# Patient Record
Sex: MALE | Race: BLACK OR AFRICAN AMERICAN | NOT HISPANIC OR LATINO | Employment: OTHER | URBAN - METROPOLITAN AREA
[De-identification: names, ages, dates, MRNs, and addresses within clinical notes are randomized per-mention and may not be internally consistent; named-entity substitution may affect disease eponyms.]

---

## 2018-07-30 DIAGNOSIS — D50.9 IRON DEFICIENCY ANEMIA, UNSPECIFIED IRON DEFICIENCY ANEMIA TYPE: Primary | ICD-10-CM

## 2018-08-07 ENCOUNTER — TELEPHONE (OUTPATIENT)
Dept: FAMILY MEDICINE | Facility: CLINIC | Age: 78
End: 2018-08-07

## 2018-08-07 NOTE — TELEPHONE ENCOUNTER
----- Message from Ilene Thomson sent at 8/7/2018  9:28 AM CDT -----  Contact: Self  Pt is calling to have blood work orders put in the system before his appt on 08/13. Please call pt to advise when orders are in . Please call [phone].

## 2018-08-07 NOTE — TELEPHONE ENCOUNTER
Returned call, advised patient that since he is a new patient he will have to wait until his appointment to get his lab orders.  Patient verbalized understanding.

## 2018-08-13 ENCOUNTER — OFFICE VISIT (OUTPATIENT)
Dept: FAMILY MEDICINE | Facility: CLINIC | Age: 78
End: 2018-08-13
Payer: MEDICARE

## 2018-08-13 VITALS
SYSTOLIC BLOOD PRESSURE: 100 MMHG | DIASTOLIC BLOOD PRESSURE: 60 MMHG | OXYGEN SATURATION: 97 % | HEART RATE: 76 BPM | BODY MASS INDEX: 23.32 KG/M2 | WEIGHT: 153.88 LBS | HEIGHT: 68 IN

## 2018-08-13 DIAGNOSIS — Z00.00 ANNUAL PHYSICAL EXAM: Primary | ICD-10-CM

## 2018-08-13 DIAGNOSIS — R41.3 MEMORY CHANGES: ICD-10-CM

## 2018-08-13 DIAGNOSIS — R79.9 ABNORMAL FINDING OF BLOOD CHEMISTRY: ICD-10-CM

## 2018-08-13 DIAGNOSIS — Z11.59 ENCOUNTER FOR HEPATITIS C SCREENING TEST FOR LOW RISK PATIENT: ICD-10-CM

## 2018-08-13 DIAGNOSIS — Z12.11 COLON CANCER SCREENING: ICD-10-CM

## 2018-08-13 DIAGNOSIS — Z12.5 SCREENING FOR PROSTATE CANCER: ICD-10-CM

## 2018-08-13 PROCEDURE — 99999 PR PBB SHADOW E&M-EST. PATIENT-LVL III: CPT | Mod: PBBFAC,,, | Performed by: FAMILY MEDICINE

## 2018-08-13 PROCEDURE — 99213 OFFICE O/P EST LOW 20 MIN: CPT | Mod: S$GLB,,, | Performed by: FAMILY MEDICINE

## 2018-08-13 RX ORDER — ASPIRIN 81 MG/1
81 TABLET ORAL DAILY
COMMUNITY

## 2018-08-13 NOTE — PROGRESS NOTES
Subjective:       Patient ID: Angelique Pierre is a 78 y.o. male.    Chief Complaint: Establish Care and Tingling in fingers    patient is a new patient to me for an annual checkup. He states he feels fine, but has problems remembering things. He has someone to help him with remembering things that needs to get done. He has a son and a daughter. He has a lady friend that helps him with remembering things. He is  and he and his wife live together, but don't have a relationship that they can discuss things. .His emergency contact is his wife, Aubrie. He feels comfortable discussing things  with his son, Angelique Pierre Jr and would like to discuss advanced directives with him    He went to the 11th grade in school. He states he has noticed that his memory is worsening over the last few years. He states he sometimes can't recall the names of things like a watch or people's names.     History reviewed. No pertinent past medical history.   History reviewed. No pertinent surgical history.  Review of patient's family history indicates:  Problem: No Known Problems      Relation: Father          Age of Onset: (Not Specified)  Problem: Cancer      Relation: Mother          Age of Onset: (Not Specified)  Problem: No Known Problems      Relation: Sister          Age of Onset: (Not Specified)  Problem: No Known Problems      Relation: Brother          Age of Onset: (Not Specified)  Problem: No Known Problems      Relation: Sister          Age of Onset: (Not Specified)    Social History    Socioeconomic History      Marital status:       Spouse name: Not on file      Number of children: Not on file      Years of education: Not on file      Highest education level: Not on file    Social Needs      Financial resource strain: Not on file      Food insecurity - worry: Not on file      Food insecurity - inability: Not on file      Transportation needs - medical: Not on file      Transportation needs - non-medical:  "Not on file    Occupational History      Not on file    Tobacco Use      Smoking status: Never Smoker      Smokeless tobacco: Never Used    Substance and Sexual Activity      Alcohol use: Yes        Alcohol/week: 0.0 oz      Drug use: No      Sexual activity: Yes    Other Topics      Concerns:        Not on file    Social History Narrative      Not on file          Review of Systems   Constitutional: Negative for fatigue.   HENT: Negative for hearing loss, rhinorrhea, sneezing and sore throat.    Eyes: Negative for visual disturbance.   Respiratory: Negative for cough, chest tightness, shortness of breath and wheezing.    Cardiovascular: Negative for chest pain, palpitations and leg swelling.   Gastrointestinal: Negative for abdominal pain, constipation, diarrhea, nausea and vomiting.   Endocrine: Negative for polydipsia, polyphagia and polyuria.   Genitourinary: Negative for dysuria, frequency, hematuria and urgency.   Musculoskeletal: Negative for arthralgias and back pain.   Skin: Negative for rash.   Neurological: Negative for dizziness, syncope, light-headedness and headaches.       Objective:       Vitals:    08/13/18 0955   BP: 100/60   Pulse: 76   SpO2: 97%   Weight: 69.8 kg (153 lb 14.1 oz)   Height: 5' 8" (1.727 m)       Physical Exam   Constitutional: He is oriented to person, place, and time. He appears well-developed and well-nourished. No distress.   HENT:   Head: Normocephalic.   Right Ear: External ear normal.   Left Ear: External ear normal.   Mouth/Throat: Oropharynx is clear and moist. No oropharyngeal exudate.   Eyes: Conjunctivae are normal.   Neck: Normal range of motion. Neck supple. No thyromegaly present.   Cardiovascular: Normal rate, regular rhythm and normal heart sounds. Exam reveals no gallop and no friction rub.   No murmur heard.  Pulmonary/Chest: Effort normal and breath sounds normal. No respiratory distress. He has no wheezes. He has no rales.   Abdominal: Soft. Bowel sounds are " normal. He exhibits no distension and no mass. There is no tenderness. There is no rebound and no guarding.   Musculoskeletal: He exhibits no edema.   Lymphadenopathy:     He has no cervical adenopathy.   Neurological: He is alert and oriented to person, place, and time.   Skin: No rash noted. He is not diaphoretic.   Psychiatric: He has a normal mood and affect.       Assessment:       1. Annual physical exam    2. Screening for prostate cancer    3. Colon cancer screening    4. Abnormal finding of blood chemistry     5. Encounter for hepatitis C screening test for low risk patient    6. Memory changes        Plan:         Angelique was seen today for establish care and tingling in fingers.    Diagnoses and all orders for this visit:    Annual physical exam  -     Lipid panel; Future  -     CBC auto differential; Future  -     Comprehensive metabolic panel; Future  Due for labs    Screening for prostate cancer  -     PSA, Screening; Future    Colon cancer screening  -     Case request GI: COLONOSCOPY  Due for colonoscopy  Abnormal finding of blood chemistry   -     Lipid panel; Future    Encounter for hepatitis C screening test for low risk patient  -     Hepatitis C antibody; Future    Memory changes  -     Vitamin B12; Future  -     Folate; Future  -     TSH; Future  -     T4, free; Future  -     CT Head Without Contrast; Future  Will check for causes  Other orders  -     Cancel: CBC auto differential; Future  -     Cancel: Comprehensive metabolic panel; Future  -     Cancel: Lipid panel; Future

## 2018-08-20 ENCOUNTER — HOSPITAL ENCOUNTER (OUTPATIENT)
Dept: RADIOLOGY | Facility: HOSPITAL | Age: 78
Discharge: HOME | End: 2018-08-20
Attending: FAMILY MEDICINE
Payer: MEDICARE

## 2018-08-20 DIAGNOSIS — R41.3 MEMORY CHANGES: ICD-10-CM

## 2018-08-20 PROCEDURE — 70450 CT HEAD/BRAIN W/O DYE: CPT | Mod: TC

## 2018-08-20 PROCEDURE — 70450 CT HEAD/BRAIN W/O DYE: CPT | Mod: 26,,, | Performed by: RADIOLOGY

## 2018-08-22 ENCOUNTER — TELEPHONE (OUTPATIENT)
Dept: FAMILY MEDICINE | Facility: CLINIC | Age: 78
End: 2018-08-22

## 2018-08-22 DIAGNOSIS — R79.89 ELEVATED PLATELET COUNT: ICD-10-CM

## 2018-08-22 DIAGNOSIS — J32.9 SINUSITIS, BACTERIAL: Primary | ICD-10-CM

## 2018-08-22 DIAGNOSIS — D64.9 NORMOCYTIC ANEMIA: ICD-10-CM

## 2018-08-22 DIAGNOSIS — B96.89 SINUSITIS, BACTERIAL: Primary | ICD-10-CM

## 2018-08-22 RX ORDER — AMOXICILLIN AND CLAVULANATE POTASSIUM 500; 125 MG/1; MG/1
1 TABLET, FILM COATED ORAL 2 TIMES DAILY
Qty: 14 TABLET | Refills: 0 | Status: SHIPPED | OUTPATIENT
Start: 2018-08-22 | End: 2018-08-29

## 2018-08-22 NOTE — TELEPHONE ENCOUNTER
Let pt know we need to do additional blood work for his blood count which was low and send him to a blood doctor if those labs are abnormal.  His platelet is high so we need to increase his Asprin to 325 mg.    Also his CT scan showed sinus disease so we will treat that with antibiotics.

## 2018-08-24 ENCOUNTER — TELEPHONE (OUTPATIENT)
Dept: FAMILY MEDICINE | Facility: CLINIC | Age: 78
End: 2018-08-24

## 2018-08-24 NOTE — TELEPHONE ENCOUNTER
----- Message from Amanda Fung sent at 8/23/2018  3:28 PM CDT -----  Contact: Dallas [phone]  Patient is returning a call back to the staff . Please call at your earliest convenience.

## 2018-08-27 ENCOUNTER — TELEPHONE (OUTPATIENT)
Dept: FAMILY MEDICINE | Facility: CLINIC | Age: 78
End: 2018-08-27

## 2018-08-27 NOTE — TELEPHONE ENCOUNTER
----- Message from Adriel Whelan sent at 8/27/2018  9:21 AM CDT -----  Contact: self  Pt is returning Anika's call from Friday regarding his test results. Please contact him at [phone].    Thanks

## 2018-08-27 NOTE — TELEPHONE ENCOUNTER
Patient would like a call from the provider.  Stated he has never been sick before and is now being told he has all of these problems.  Would like to speak to the doctor so she can explain how and why all of this is happening at this time.  Please advise.

## 2018-08-28 NOTE — TELEPHONE ENCOUNTER
Left message for patient to call clinic back to schedule follow up/results and additional labs that are ordered (non fasting)

## 2018-08-29 ENCOUNTER — OFFICE VISIT (OUTPATIENT)
Dept: FAMILY MEDICINE | Facility: CLINIC | Age: 78
End: 2018-08-29
Payer: MEDICARE

## 2018-08-29 ENCOUNTER — LAB VISIT (OUTPATIENT)
Dept: LAB | Facility: HOSPITAL | Age: 78
End: 2018-08-29
Attending: FAMILY MEDICINE
Payer: MEDICARE

## 2018-08-29 VITALS
HEIGHT: 67 IN | BODY MASS INDEX: 23.46 KG/M2 | SYSTOLIC BLOOD PRESSURE: 110 MMHG | OXYGEN SATURATION: 97 % | TEMPERATURE: 98 F | DIASTOLIC BLOOD PRESSURE: 78 MMHG | WEIGHT: 149.5 LBS | HEART RATE: 68 BPM

## 2018-08-29 DIAGNOSIS — D50.9 MICROCYTIC ANEMIA: ICD-10-CM

## 2018-08-29 DIAGNOSIS — R79.89 ELEVATED PLATELET COUNT: ICD-10-CM

## 2018-08-29 DIAGNOSIS — G62.9 NEUROPATHY: Primary | ICD-10-CM

## 2018-08-29 DIAGNOSIS — D64.9 NORMOCYTIC ANEMIA: ICD-10-CM

## 2018-08-29 LAB
BASOPHILS # BLD AUTO: 0.16 K/UL
BASOPHILS NFR BLD: 2.7 %
DIFFERENTIAL METHOD: ABNORMAL
EOSINOPHIL # BLD AUTO: 0.1 K/UL
EOSINOPHIL NFR BLD: 2 %
ERYTHROCYTE [DISTWIDTH] IN BLOOD BY AUTOMATED COUNT: 26.1 %
FERRITIN SERPL-MCNC: 427 NG/ML
HCT VFR BLD AUTO: 33.5 %
HGB BLD-MCNC: 11.1 G/DL
IRON SERPL-MCNC: 127 UG/DL
LYMPHOCYTES # BLD AUTO: 1.5 K/UL
LYMPHOCYTES NFR BLD: 24.3 %
MCH RBC QN AUTO: 27.5 PG
MCHC RBC AUTO-ENTMCNC: 33.1 G/DL
MCV RBC AUTO: 83 FL
MONOCYTES # BLD AUTO: 0.7 K/UL
MONOCYTES NFR BLD: 12.3 %
NEUTROPHILS # BLD AUTO: 3.5 K/UL
NEUTROPHILS NFR BLD: 58.7 %
PATH REV BLD -IMP: NORMAL
PLATELET # BLD AUTO: 963 K/UL
PMV BLD AUTO: 9.1 FL
RBC # BLD AUTO: 4.04 M/UL
SATURATED IRON: 44 %
TOTAL IRON BINDING CAPACITY: 286 UG/DL
TRANSFERRIN SERPL-MCNC: 193 MG/DL
WBC # BLD AUTO: 6.02 K/UL

## 2018-08-29 PROCEDURE — 99213 OFFICE O/P EST LOW 20 MIN: CPT | Mod: S$GLB,,, | Performed by: FAMILY MEDICINE

## 2018-08-29 PROCEDURE — 36415 COLL VENOUS BLD VENIPUNCTURE: CPT | Mod: PO

## 2018-08-29 PROCEDURE — 83540 ASSAY OF IRON: CPT

## 2018-08-29 PROCEDURE — 99999 PR PBB SHADOW E&M-EST. PATIENT-LVL III: CPT | Mod: PBBFAC,,, | Performed by: FAMILY MEDICINE

## 2018-08-29 PROCEDURE — 85060 BLOOD SMEAR INTERPRETATION: CPT | Mod: ,,, | Performed by: PATHOLOGY

## 2018-08-29 PROCEDURE — 85025 COMPLETE CBC W/AUTO DIFF WBC: CPT

## 2018-08-29 PROCEDURE — 82728 ASSAY OF FERRITIN: CPT

## 2018-08-29 PROCEDURE — 83021 HEMOGLOBIN CHROMOTOGRAPHY: CPT

## 2018-08-29 RX ORDER — GABAPENTIN 100 MG/1
100 CAPSULE ORAL NIGHTLY
Qty: 30 CAPSULE | Refills: 11 | Status: SHIPPED | OUTPATIENT
Start: 2018-08-29 | End: 2018-09-26

## 2018-08-29 NOTE — PROGRESS NOTES
Subjective:       Patient ID: Dallas Pierre is a 78 y.o. male.    Chief Complaint: Tingling (in fingers.. Still going on); Follow-up; and Results (labs)    78 year old male presents for follow-up on his test results. He has been experiencing tingling his fingers. He had labs, which showed a normocytic anemia with normal B 12, folate. He has a normal CMP and lipid as well. He states he has been told he is anemic, but is unaware if he has some sort of hemoglobinopathy.       No past medical history on file.   No past surgical history on file.  Review of patient's family history indicates:  Problem: No Known Problems      Relation: Father          Age of Onset: (Not Specified)  Problem: Cancer      Relation: Mother          Age of Onset: (Not Specified)  Problem: No Known Problems      Relation: Sister          Age of Onset: (Not Specified)  Problem: No Known Problems      Relation: Brother          Age of Onset: (Not Specified)  Problem: No Known Problems      Relation: Sister          Age of Onset: (Not Specified)    Social History    Socioeconomic History      Marital status:       Spouse name: Not on file      Number of children: Not on file      Years of education: Not on file      Highest education level: Not on file    Social Needs      Financial resource strain: Not on file      Food insecurity - worry: Not on file      Food insecurity - inability: Not on file      Transportation needs - medical: Not on file      Transportation needs - non-medical: Not on file    Occupational History      Not on file    Tobacco Use      Smoking status: Never Smoker      Smokeless tobacco: Never Used    Substance and Sexual Activity      Alcohol use: Yes        Alcohol/week: 0.0 oz      Drug use: No      Sexual activity: Yes    Other Topics      Concerns:        Not on file    Social History Narrative      Not on file          Review of Systems   Constitutional: Negative for fatigue.   Respiratory: Negative for cough,  "chest tightness, shortness of breath and wheezing.    Cardiovascular: Negative for chest pain, palpitations and leg swelling.   Gastrointestinal: Negative for abdominal pain, nausea and vomiting.   Endocrine: Negative for polydipsia, polyphagia and polyuria.   Neurological: Negative for dizziness, syncope, light-headedness and headaches.       Objective:       Vitals:    08/29/18 0926   BP: 110/78   Pulse: 68   Temp: 98.4 °F (36.9 °C)   TempSrc: Oral   SpO2: 97%   Weight: 67.8 kg (149 lb 7.6 oz)   Height: 5' 7" (1.702 m)       Physical Exam   Constitutional: He is oriented to person, place, and time. He appears well-developed and well-nourished. No distress.   HENT:   Head: Normocephalic and atraumatic.   Eyes: Conjunctivae are normal.   Neck: Normal range of motion. Neck supple.   Cardiovascular: Normal rate, regular rhythm and normal heart sounds. Exam reveals no gallop and no friction rub.   No murmur heard.  Pulmonary/Chest: Effort normal and breath sounds normal. No respiratory distress. He has no wheezes. He has no rales.   Neurological: He is alert and oriented to person, place, and time.   Skin: He is not diaphoretic.       Assessment:       1. Neuropathy    2. Microcytic anemia        Plan:       Dallas was seen today for tingling, follow-up and results.    Diagnoses and all orders for this visit:    Neuropathy  -     gabapentin (NEURONTIN) 100 MG capsule; Take 1 capsule (100 mg total) by mouth every evening.  trial of gabapentin to help with neuropathy.        Microcytic anemia  He is going to get labs to check for causes of anemia      "

## 2018-08-30 LAB — PATH REV BLD -IMP: NORMAL

## 2018-08-31 LAB
HGB A2 MFR BLD HPLC: 2.1 %
HGB FRACT BLD ELPH-IMP: ABNORMAL
HGB FRACT BLD ELPH-IMP: NORMAL

## 2018-09-11 NOTE — PROGRESS NOTES
Chief Complaint :   Increased Platelet Count    Hx of Present illness :  Patient is a 78 y.o. year old male who presents to the clinic today for   Oncology evaluation      Allergies :    Review of patient's allergies indicates:  No Known Allergies    Occupation :  Landscaping    Transfusion :  None    Menstrual & obstetric Hx :  N/A      Present Meds :      Medication List           Accurate as of 9/11/18  7:37 AM. If you have any questions, ask your nurse or doctor.               CONTINUE taking these medications    aspirin 81 MG EC tablet  Commonly known as:  ECOTRIN     gabapentin 100 MG capsule  Commonly known as:  NEURONTIN  Take 1 capsule (100 mg total) by mouth every evening.            Past Medical Hx : No Hx of Hypertension, DM, PUD, Hepatitis, Liver disease, thyroid dysfunction, TB, sarcoid, Lupus, rheumatoid arthritis, seizure disorder, CVA.  No DVT or PE. No Hx of Cancer, Chemotherapy or radiation therapy.    Past Medical Hx :  No past medical history on file.    Travel Hx :  N/A    Immunization :    There is no immunization history on file for this patient.    Family Hx :  Family History   Problem Relation Age of Onset    No Known Problems Father     Cancer Mother     No Known Problems Sister     No Known Problems Brother     No Known Problems Sister        Social Hx :  Social History     Socioeconomic History    Marital status:      Spouse name: Not on file    Number of children: Not on file    Years of education: Not on file    Highest education level: Not on file   Social Needs    Financial resource strain: Not on file    Food insecurity - worry: Not on file    Food insecurity - inability: Not on file    Transportation needs - medical: Not on file    Transportation needs - non-medical: Not on file   Occupational History    Not on file   Tobacco Use    Smoking status: Never Smoker    Smokeless tobacco: Never Used   Substance and Sexual Activity    Alcohol use: Yes      Alcohol/week: 0.0 oz    Drug use: No    Sexual activity: Yes   Other Topics Concern    Not on file   Social History Narrative    Not on file       Surgery :  Colonoscopy.    Symptoms :    Review of Systems   Constitutional: Negative for chills, diaphoresis, fever, malaise/fatigue and weight loss.        Good appetite; no fever, pruritis, night sweats.   HENT: Positive for hearing loss and nosebleeds (Had Two nosebleeds last week.). Negative for congestion, ear discharge, ear pain, sinus pain, sore throat and tinnitus.    Eyes: Negative for blurred vision, double vision, photophobia, pain, discharge and redness.        Vision not Clear.   Respiratory: Negative for cough, hemoptysis, sputum production, shortness of breath, wheezing and stridor.    Cardiovascular: Negative for chest pain, palpitations, orthopnea, claudication, leg swelling and PND.   Gastrointestinal: Negative for abdominal pain, blood in stool, constipation, diarrhea, heartburn, melena, nausea and vomiting.   Genitourinary: Negative for dysuria, flank pain, frequency, hematuria and urgency.        Slow stream. Hx of BPH   Musculoskeletal: Negative for back pain, falls, joint pain, myalgias and neck pain.   Skin: Negative for itching and rash.   Neurological: Negative for dizziness, tingling, tremors, sensory change, speech change, focal weakness, seizures, loss of consciousness, weakness and headaches.   Endo/Heme/Allergies: Negative for environmental allergies and polydipsia. Does not bruise/bleed easily.   Psychiatric/Behavioral: Negative for depression, hallucinations, memory loss, substance abuse and suicidal ideas. The patient is nervous/anxious and has insomnia.        Physical Exam :   Physical Exam   Constitutional: He is oriented to person, place, and time and well-developed, well-nourished, and in no distress. Vital signs are normal. No distress.   HENT:   Head: Normocephalic and atraumatic.   Right Ear: External ear normal.   Left Ear:  External ear normal.   Nose: Nose normal.   Mouth/Throat: Oropharynx is clear and moist. No oropharyngeal exudate.   Eyes: Conjunctivae, EOM and lids are normal. Pupils are equal, round, and reactive to light. Lids are everted and swept, no foreign bodies found. Right eye exhibits no discharge. Left eye exhibits no discharge. No scleral icterus.   Neck: Trachea normal, normal range of motion and full passive range of motion without pain. Neck supple. Normal carotid pulses, no hepatojugular reflux and no JVD present. No tracheal tenderness present. Carotid bruit is not present. No tracheal deviation present. No thyroid mass and no thyromegaly present.   Cardiovascular: Normal rate, regular rhythm, normal heart sounds, intact distal pulses and normal pulses. Exam reveals no gallop and no friction rub.   No murmur heard.  Pulmonary/Chest: Effort normal and breath sounds normal. No stridor. No apnea. No respiratory distress. He has no wheezes. He has no rales. He exhibits no tenderness.   Abdominal: Soft. Normal appearance, normal aorta and bowel sounds are normal. He exhibits no distension and no mass. There is no hepatosplenomegaly. There is no tenderness. There is no rebound, no guarding and no CVA tenderness. No hernia.   Genitourinary:   Genitourinary Comments: Not examined   Musculoskeletal: He exhibits no edema, tenderness or deformity.   Lymphadenopathy:        Head (right side): No submental, no submandibular, no tonsillar, no preauricular, no posterior auricular and no occipital adenopathy present.        Head (left side): No submental, no submandibular, no tonsillar, no preauricular, no posterior auricular and no occipital adenopathy present.     He has no cervical adenopathy.     He has no axillary adenopathy.        Right: No inguinal, no supraclavicular and no epitrochlear adenopathy present.        Left: No inguinal, no supraclavicular and no epitrochlear adenopathy present.   Neurological: He is alert  and oriented to person, place, and time. He has normal motor skills, normal strength and intact cranial nerves. No cranial nerve deficit. Gait normal. Coordination normal. GCS score is 15.   Skin: Skin is warm, dry and intact. No rash noted. He is not diaphoretic. No cyanosis or erythema. No pallor. Nails show no clubbing.   Psychiatric: Mood, memory, affect and judgment normal.         Labs & Imaging : 08/29/18 : Hgb 11.1; Hct 33.5; MCV 83; Plts 963,000  ANC 3,100.  Plt Count was 934,000 on 08/20/18;  CMP normal on 08/20/18.  08/29/18 : Serum B12 and Folate were normal.  Hemoglobin electrophoresis normal. HCV antibody Neg. PSA 1.2        Dx :   Thrombocytosis.      Assessment & Plan: reviewed with patient. Repeat CBC,Plts, LDH, Uric acid, serum Iron,  CHARLES 2 mutation, Chest X Ray, U/S abdomen, re evaluate with results. Patient understands and verbalised.

## 2018-09-12 ENCOUNTER — INITIAL CONSULT (OUTPATIENT)
Dept: HEMATOLOGY/ONCOLOGY | Facility: CLINIC | Age: 78
End: 2018-09-12
Payer: MEDICARE

## 2018-09-12 VITALS
SYSTOLIC BLOOD PRESSURE: 118 MMHG | OXYGEN SATURATION: 97 % | WEIGHT: 146.5 LBS | HEIGHT: 67 IN | BODY MASS INDEX: 22.99 KG/M2 | TEMPERATURE: 98 F | HEART RATE: 68 BPM | DIASTOLIC BLOOD PRESSURE: 75 MMHG

## 2018-09-12 DIAGNOSIS — D75.839 THROMBOCYTOSIS: Primary | ICD-10-CM

## 2018-09-12 PROCEDURE — 1101F PT FALLS ASSESS-DOCD LE1/YR: CPT | Mod: CPTII,,, | Performed by: INTERNAL MEDICINE

## 2018-09-12 PROCEDURE — 99205 OFFICE O/P NEW HI 60 MIN: CPT | Mod: S$PBB,,, | Performed by: INTERNAL MEDICINE

## 2018-09-12 PROCEDURE — 99999 PR PBB SHADOW E&M-EST. PATIENT-LVL III: CPT | Mod: PBBFAC,,, | Performed by: INTERNAL MEDICINE

## 2018-09-12 PROCEDURE — 99213 OFFICE O/P EST LOW 20 MIN: CPT | Mod: PBBFAC | Performed by: INTERNAL MEDICINE

## 2018-09-12 NOTE — LETTER
September 12, 2018      KRISTEN Fermin  [address] [address]  [place] [address]           [facility]  [address] [facility] [address] [place] [address]  Phone: [phone]          Patient: Dallas Pierre   MR Number: [id number]   YOB: 1940   Date of Visit: 9/12/2018       Dear Dany Fischer:    Thank you for referring Dallas Pierre to me for evaluation. Attached you will find relevant portions of my assessment and plan of care.    If you have questions, please do not hesitate to call me. I look forward to following Dallas Pierre along with you.    Sincerely,    Alpa Calhoun MD    Enclosure  CC:  No Recipients    If you would like to receive this communication electronically, please contact [email or web address] or [phone] to request more information on [employer] access.    For providers and/or their staff who would like to refer a patient to [facility], please contact us through our one-stop-shop provider referral line, Norbert Clement, at [phone].    If you feel you have received this communication in error or would no longer like to receive these types of communications, please e-mail [email or web address]

## 2018-09-13 ENCOUNTER — LAB VISIT (OUTPATIENT)
Dept: LAB | Facility: HOSPITAL | Age: 78
End: 2018-09-13
Attending: INTERNAL MEDICINE
Payer: MEDICARE

## 2018-09-13 DIAGNOSIS — D75.839 THROMBOCYTOSIS: ICD-10-CM

## 2018-09-13 LAB
BASOPHILS # BLD AUTO: 0.19 K/UL
BASOPHILS NFR BLD: 2.9 %
DIFFERENTIAL METHOD: ABNORMAL
EOSINOPHIL # BLD AUTO: 0.1 K/UL
EOSINOPHIL NFR BLD: 2.1 %
ERYTHROCYTE [DISTWIDTH] IN BLOOD BY AUTOMATED COUNT: 26.1 %
HCT VFR BLD AUTO: 32.3 %
HGB BLD-MCNC: 10.8 G/DL
IRON SERPL-MCNC: 178 UG/DL
LDH SERPL L TO P-CCNC: 181 U/L
LYMPHOCYTES # BLD AUTO: 1.6 K/UL
LYMPHOCYTES NFR BLD: 23.7 %
MCH RBC QN AUTO: 27.4 PG
MCHC RBC AUTO-ENTMCNC: 33.4 G/DL
MCV RBC AUTO: 82 FL
MONOCYTES # BLD AUTO: 0.6 K/UL
MONOCYTES NFR BLD: 9.8 %
NEUTROPHILS # BLD AUTO: 4 K/UL
NEUTROPHILS NFR BLD: 61.5 %
PLATELET # BLD AUTO: 948 K/UL
PMV BLD AUTO: 9 FL
RBC # BLD AUTO: 3.94 M/UL
SATURATED IRON: 61 %
TOTAL IRON BINDING CAPACITY: 293 UG/DL
TRANSFERRIN SERPL-MCNC: 198 MG/DL
URATE SERPL-MCNC: 5.4 MG/DL
WBC # BLD AUTO: 6.54 K/UL

## 2018-09-13 PROCEDURE — 85025 COMPLETE CBC W/AUTO DIFF WBC: CPT

## 2018-09-13 PROCEDURE — 36415 COLL VENOUS BLD VENIPUNCTURE: CPT

## 2018-09-13 PROCEDURE — 83540 ASSAY OF IRON: CPT

## 2018-09-13 PROCEDURE — 81270 JAK2 GENE: CPT

## 2018-09-13 PROCEDURE — 84550 ASSAY OF BLOOD/URIC ACID: CPT

## 2018-09-13 PROCEDURE — 83615 LACTATE (LD) (LDH) ENZYME: CPT

## 2018-09-13 PROCEDURE — 82608 B-12 BINDING CAPACITY: CPT

## 2018-09-17 LAB
MPNR  FINAL DIAGNOSIS: NORMAL
MPNR  SPECIMEN TYPE: NORMAL
MPNR RESULT: NORMAL
VIT B12 USB SERPL-MCNC: 1535 PG/ML (ref 800–2600)

## 2018-09-26 ENCOUNTER — OFFICE VISIT (OUTPATIENT)
Dept: FAMILY MEDICINE | Facility: CLINIC | Age: 78
End: 2018-09-26
Payer: MEDICARE

## 2018-09-26 VITALS
HEART RATE: 70 BPM | TEMPERATURE: 98 F | DIASTOLIC BLOOD PRESSURE: 70 MMHG | WEIGHT: 149.06 LBS | OXYGEN SATURATION: 99 % | SYSTOLIC BLOOD PRESSURE: 110 MMHG | BODY MASS INDEX: 23.69 KG/M2

## 2018-09-26 DIAGNOSIS — D75.839 THROMBOCYTOSIS: ICD-10-CM

## 2018-09-26 DIAGNOSIS — G62.9 NEUROPATHY: Primary | ICD-10-CM

## 2018-09-26 PROCEDURE — 99999 PR PBB SHADOW E&M-EST. PATIENT-LVL II: CPT | Mod: PBBFAC,,, | Performed by: FAMILY MEDICINE

## 2018-09-26 PROCEDURE — 1101F PT FALLS ASSESS-DOCD LE1/YR: CPT | Mod: CPTII,,, | Performed by: FAMILY MEDICINE

## 2018-09-26 PROCEDURE — 99212 OFFICE O/P EST SF 10 MIN: CPT | Mod: PBBFAC,PO | Performed by: FAMILY MEDICINE

## 2018-09-26 PROCEDURE — 99213 OFFICE O/P EST LOW 20 MIN: CPT | Mod: S$PBB,,, | Performed by: FAMILY MEDICINE

## 2018-09-26 RX ORDER — GABAPENTIN 300 MG/1
300 CAPSULE ORAL NIGHTLY
Qty: 30 CAPSULE | Refills: 5 | Status: SHIPPED | OUTPATIENT
Start: 2018-09-26 | End: 2019-09-26

## 2018-09-26 NOTE — PROGRESS NOTES
Subjective:       Patient ID: Dallas Pierre is a 78 y.o. male.    Chief Complaint: 2 week follow up    Patient presents for follow-up. He has neuropathy in his hands. He has been started on gabapentin 100mg and only showed mild improvement. He states it doesn't make him drowsy.     He has thrombocytosis and has had repeat labs showing this again. He has been seen by hematology for evaluation of this. His labs were reviewed and noted to be normal.       No past medical history on file.   No past surgical history on file.  Review of patient's family history indicates:  Problem: No Known Problems      Relation: Father          Age of Onset: (Not Specified)  Problem: Cancer      Relation: Mother          Age of Onset: (Not Specified)  Problem: No Known Problems      Relation: Sister          Age of Onset: (Not Specified)  Problem: No Known Problems      Relation: Brother          Age of Onset: (Not Specified)  Problem: No Known Problems      Relation: Sister          Age of Onset: (Not Specified)    Social History    Socioeconomic History      Marital status:       Spouse name: Not on file      Number of children: Not on file      Years of education: Not on file      Highest education level: Not on file    Social Needs      Financial resource strain: Not on file      Food insecurity - worry: Not on file      Food insecurity - inability: Not on file      Transportation needs - medical: Not on file      Transportation needs - non-medical: Not on file    Occupational History      Not on file    Tobacco Use      Smoking status: Former Smoker      Smokeless tobacco: Never Used    Substance and Sexual Activity      Alcohol use: Yes        Alcohol/week: 0.0 oz      Drug use: No      Sexual activity: Yes    Other Topics      Concerns:        Not on file    Social History Narrative      Not on file          Review of Systems    Objective:       Vitals:    09/26/18 1604   BP: 110/70   Pulse: 70   Temp: 98.3 °F (36.8 °C)    TempSrc: Oral   SpO2: 99%   Weight: 67.6 kg (149 lb 0.5 oz)       Physical Exam   Constitutional: He is oriented to person, place, and time. He appears well-developed and well-nourished. No distress.   HENT:   Head: Normocephalic and atraumatic.   Eyes: Conjunctivae are normal.   Neck: Normal range of motion. Neck supple.   Cardiovascular: Normal rate, regular rhythm and normal heart sounds. Exam reveals no gallop and no friction rub.   No murmur heard.  Pulmonary/Chest: Effort normal and breath sounds normal. No respiratory distress. He has no wheezes. He has no rales.   Neurological: He is alert and oriented to person, place, and time.   Skin: He is not diaphoretic.   Psychiatric: He has a normal mood and affect.       Assessment:       1. Neuropathy    2. Thrombocytosis        Plan:       Dallas was seen today for 2 week follow up.    Diagnoses and all orders for this visit:    Neuropathy  -     gabapentin (NEURONTIN) 300 MG capsule; Take 1 capsule (300 mg total) by mouth every evening.  Increase gabapentin from 100 to 300 mg daily.     Thrombocytosis  Per heme onc

## 2018-09-29 ENCOUNTER — HOSPITAL ENCOUNTER (OUTPATIENT)
Dept: RADIOLOGY | Facility: HOSPITAL | Age: 78
Discharge: HOME | End: 2018-09-29
Attending: INTERNAL MEDICINE
Payer: MEDICARE

## 2018-09-29 DIAGNOSIS — D75.839 THROMBOCYTOSIS: ICD-10-CM

## 2018-09-29 PROCEDURE — 71046 X-RAY EXAM CHEST 2 VIEWS: CPT | Mod: 26,,, | Performed by: RADIOLOGY

## 2018-09-29 PROCEDURE — 76700 US EXAM ABDOM COMPLETE: CPT | Mod: TC

## 2018-09-29 PROCEDURE — 71046 X-RAY EXAM CHEST 2 VIEWS: CPT | Mod: TC,FY

## 2018-09-29 PROCEDURE — 76700 US EXAM ABDOM COMPLETE: CPT | Mod: 26,,, | Performed by: RADIOLOGY

## 2018-10-01 ENCOUNTER — TELEPHONE (OUTPATIENT)
Dept: HEMATOLOGY/ONCOLOGY | Facility: CLINIC | Age: 78
End: 2018-10-01

## 2018-10-01 ENCOUNTER — OFFICE VISIT (OUTPATIENT)
Dept: HEMATOLOGY/ONCOLOGY | Facility: CLINIC | Age: 78
End: 2018-10-01
Payer: MEDICARE

## 2018-10-01 VITALS
HEIGHT: 67 IN | HEART RATE: 73 BPM | BODY MASS INDEX: 23.2 KG/M2 | DIASTOLIC BLOOD PRESSURE: 66 MMHG | SYSTOLIC BLOOD PRESSURE: 116 MMHG | WEIGHT: 147.81 LBS | OXYGEN SATURATION: 98 % | TEMPERATURE: 98 F

## 2018-10-01 DIAGNOSIS — D75.839 THROMBOCYTOSIS: Primary | ICD-10-CM

## 2018-10-01 DIAGNOSIS — Z15.89 JAK2 GENE MUTATION: ICD-10-CM

## 2018-10-01 DIAGNOSIS — D64.9 ANEMIA, UNSPECIFIED TYPE: ICD-10-CM

## 2018-10-01 PROCEDURE — 1101F PT FALLS ASSESS-DOCD LE1/YR: CPT | Mod: CPTII,,, | Performed by: INTERNAL MEDICINE

## 2018-10-01 PROCEDURE — 99999 PR PBB SHADOW E&M-EST. PATIENT-LVL III: CPT | Mod: PBBFAC,,, | Performed by: INTERNAL MEDICINE

## 2018-10-01 PROCEDURE — 99214 OFFICE O/P EST MOD 30 MIN: CPT | Mod: S$PBB,,, | Performed by: INTERNAL MEDICINE

## 2018-10-01 PROCEDURE — 99213 OFFICE O/P EST LOW 20 MIN: CPT | Mod: PBBFAC | Performed by: INTERNAL MEDICINE

## 2018-10-01 NOTE — PROGRESS NOTES
Chief Complaint :   Increased Platelet Count    Hx of Present illness :  Patient is a 78 y.o. year old male who presents to the clinic today for   Oncology  followup to review results      Allergies :    Review of patient's allergies indicates:  No Known Allergies    Occupation :  Landscaping    Transfusion :  None    Menstrual & obstetric Hx :  N/A      Present Meds :      Medication List           Accurate as of 10/1/18  7:26 AM. If you have any questions, ask your nurse or doctor.               CONTINUE taking these medications    aspirin 81 MG EC tablet  Commonly known as:  ECOTRIN     gabapentin 300 MG capsule  Commonly known as:  NEURONTIN  Take 1 capsule (300 mg total) by mouth every evening.            Past Medical Hx : No Hx of Hypertension, DM, PUD, Hepatitis, Liver disease, thyroid dysfunction, TB, sarcoid, Lupus, rheumatoid arthritis, seizure disorder, CVA.  No DVT or PE. No Hx of Cancer, Chemotherapy or radiation therapy.    Past Medical Hx :  No past medical history on file.    Travel Hx :  N/A    Immunization :    There is no immunization history on file for this patient.    Family Hx :  Family History   Problem Relation Age of Onset    No Known Problems Father     Cancer Mother     No Known Problems Sister     No Known Problems Brother     No Known Problems Sister        Social Hx :  Social History     Socioeconomic History    Marital status:      Spouse name: Not on file    Number of children: Not on file    Years of education: Not on file    Highest education level: Not on file   Social Needs    Financial resource strain: Not on file    Food insecurity - worry: Not on file    Food insecurity - inability: Not on file    Transportation needs - medical: Not on file    Transportation needs - non-medical: Not on file   Occupational History    Not on file   Tobacco Use    Smoking status: Former Smoker    Smokeless tobacco: Never Used   Substance and Sexual Activity    Alcohol  use: Yes     Alcohol/week: 0.0 oz    Drug use: No    Sexual activity: Yes   Other Topics Concern    Not on file   Social History Narrative    Not on file       Surgery :  Colonoscopy.    Symptoms :  No New Sx    Review of Systems   Constitutional: Negative for chills, diaphoresis, fever, malaise/fatigue and weight loss.        Good appetite; no fever, pruritis, night sweats.   HENT: Positive for hearing loss and nosebleeds (Had Two nosebleeds last week.). Negative for congestion, ear discharge, ear pain, sinus pain, sore throat and tinnitus.    Eyes: Negative for blurred vision, double vision, photophobia, pain, discharge and redness.        Vision not Clear.   Respiratory: Negative for cough, hemoptysis, sputum production, shortness of breath, wheezing and stridor.    Cardiovascular: Negative for chest pain, palpitations, orthopnea, claudication, leg swelling and PND.   Gastrointestinal: Negative for abdominal pain, blood in stool, constipation, diarrhea, heartburn, melena, nausea and vomiting.   Genitourinary: Negative for dysuria, flank pain, frequency, hematuria and urgency.        Slow stream. Hx of BPH   Musculoskeletal: Negative for back pain, falls, joint pain, myalgias and neck pain.   Skin: Negative for itching and rash.   Neurological: Negative for dizziness, tingling, tremors, sensory change, speech change, focal weakness, seizures, loss of consciousness, weakness and headaches.   Endo/Heme/Allergies: Negative for environmental allergies and polydipsia. Does not bruise/bleed easily.   Psychiatric/Behavioral: Negative for depression, hallucinations, memory loss, substance abuse and suicidal ideas. The patient is nervous/anxious and has insomnia.        Physical Exam :   Physical Exam   Constitutional: He is oriented to person, place, and time and well-developed, well-nourished, and in no distress. Vital signs are normal. No distress.   HENT:   Head: Normocephalic and atraumatic.   Right Ear: External  ear normal.   Left Ear: External ear normal.   Nose: Nose normal.   Mouth/Throat: Oropharynx is clear and moist. No oropharyngeal exudate.   Eyes: Conjunctivae, EOM and lids are normal. Pupils are equal, round, and reactive to light. Lids are everted and swept, no foreign bodies found. Right eye exhibits no discharge. Left eye exhibits no discharge. No scleral icterus.   Neck: Trachea normal, normal range of motion and full passive range of motion without pain. Neck supple. Normal carotid pulses, no hepatojugular reflux and no JVD present. No tracheal tenderness present. Carotid bruit is not present. No tracheal deviation present. No thyroid mass and no thyromegaly present.   Cardiovascular: Normal rate, regular rhythm, normal heart sounds, intact distal pulses and normal pulses. Exam reveals no gallop and no friction rub.   No murmur heard.  Pulmonary/Chest: Effort normal and breath sounds normal. No stridor. No apnea. No respiratory distress. He has no wheezes. He has no rales. He exhibits no tenderness.   Abdominal: Soft. Normal appearance, normal aorta and bowel sounds are normal. He exhibits no distension and no mass. There is no hepatosplenomegaly. There is no tenderness. There is no rebound, no guarding and no CVA tenderness. No hernia.   Genitourinary:   Genitourinary Comments: Not examined   Musculoskeletal: He exhibits no edema, tenderness or deformity.   Lymphadenopathy:        Head (right side): No submental, no submandibular, no tonsillar, no preauricular, no posterior auricular and no occipital adenopathy present.        Head (left side): No submental, no submandibular, no tonsillar, no preauricular, no posterior auricular and no occipital adenopathy present.     He has no cervical adenopathy.     He has no axillary adenopathy.        Right: No inguinal, no supraclavicular and no epitrochlear adenopathy present.        Left: No inguinal, no supraclavicular and no epitrochlear adenopathy present.    Neurological: He is alert and oriented to person, place, and time. He has normal motor skills, normal strength and intact cranial nerves. No cranial nerve deficit. Gait normal. Coordination normal. GCS score is 15.   Skin: Skin is warm, dry and intact. No rash noted. He is not diaphoretic. No cyanosis or erythema. No pallor. Nails show no clubbing.   Psychiatric: Mood, memory, affect and judgment normal.   No new finding      Labs & Imaging : 08/29/18 : Hgb 11.1; Hct 33.5; MCV 83; Plts 963,000  ANC 3,100.  Plt Count was 934,000 on 08/20/18;  CMP normal on 08/20/18.  08/29/18 : Serum B12 and Folate were normal.  Hemoglobin electrophoresis normal. HCV antibody Neg. 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 B12 Binding Capacity 1,535. . Uric Acid 5.4. Serum Iron 118; TIBC 293.  Sat 61 %.  Hgb 10.8; Hct 32.3.  WBC 6,540. Plts 948,000. ANC 4,000.  Chest Xray :  No mass adenoipathy or effusion. U/S abdomen : hypoechoic aeas in Liver      Dx :   Thrombocytosis. Anemia. CHARLES 2 Mutation positive.       Assessment & Plan: Reviewed with patient. Schedule CT abdomen & Bone Marrow studies. Continue Baby ASAPatient understands and verbalised.

## 2018-10-01 NOTE — TELEPHONE ENCOUNTER
Attempted to call pt to notify him of bone marrow biopsy date & instructions, no answer, left voicemail. Also mailed copy of instructions.

## 2018-10-03 NOTE — TELEPHONE ENCOUNTER
Called & spoke with pt, confirmed bone marrow biopsy on Monday for 1pm, 12pm arrival. Instructed to hold Aspirin from now until after procedure, that he must have a ride & to be NPO after midnight. He voiced understanding. Also mailed instructions to him.

## 2018-10-08 ENCOUNTER — TELEPHONE (OUTPATIENT)
Dept: HEMATOLOGY/ONCOLOGY | Facility: CLINIC | Age: 78
End: 2018-10-08

## 2018-10-08 NOTE — TELEPHONE ENCOUNTER
Called pt regarding missed appt for bone marrow biopsy, he states he called earlier & cancelled it, he is unsure who he spoke to. He states he is not sure he wants or needs the procedure. He will follow up with  at the end of the month & discuss it more at that time. Pt states he wants to do more research. Will notify MD.

## 2021-11-30 ENCOUNTER — PES CALL (OUTPATIENT)
Dept: ADMINISTRATIVE | Facility: CLINIC | Age: 81
End: 2021-11-30
Payer: MEDICARE

## 2022-02-04 ENCOUNTER — PES CALL (OUTPATIENT)
Dept: ADMINISTRATIVE | Facility: CLINIC | Age: 82
End: 2022-02-04
Payer: MEDICARE

## 2022-08-02 ENCOUNTER — PES CALL (OUTPATIENT)
Dept: ADMINISTRATIVE | Facility: CLINIC | Age: 82
End: 2022-08-02
Payer: MEDICARE

## 2023-04-05 ENCOUNTER — PES CALL (OUTPATIENT)
Dept: ADMINISTRATIVE | Facility: CLINIC | Age: 83
End: 2023-04-05
Payer: MEDICARE

## 2023-05-25 ENCOUNTER — PES CALL (OUTPATIENT)
Dept: ADMINISTRATIVE | Facility: CLINIC | Age: 83
End: 2023-05-25
Payer: MEDICARE

## 2023-06-21 ENCOUNTER — PES CALL (OUTPATIENT)
Dept: ADMINISTRATIVE | Facility: CLINIC | Age: 83
End: 2023-06-21
Payer: MEDICARE

## 2023-08-29 ENCOUNTER — PATIENT OUTREACH (OUTPATIENT)
Dept: ADMINISTRATIVE | Facility: HOSPITAL | Age: 83
End: 2023-08-29
Payer: MEDICARE

## 2023-08-29 NOTE — PROGRESS NOTES
Health Maintenance Due   Topic Date Due    COVID-19 Vaccine (4 - Pfizer series) 12/03/2021    Lipid Panel  08/20/2023     Chart review done. HM updated. Immunizations reviewed & updated. Care Everywhere updated.  I CALLED THE [facility] ABOUT HIS PCP STATUS. NO ANSWER L/M

## 2025-03-24 DIAGNOSIS — Z00.00 ENCOUNTER FOR MEDICARE ANNUAL WELLNESS EXAM: ICD-10-CM
